# Patient Record
Sex: FEMALE | URBAN - METROPOLITAN AREA
[De-identification: names, ages, dates, MRNs, and addresses within clinical notes are randomized per-mention and may not be internally consistent; named-entity substitution may affect disease eponyms.]

---

## 2023-04-26 ENCOUNTER — APPOINTMENT (RX ONLY)
Dept: URBAN - METROPOLITAN AREA CLINIC 111 | Facility: CLINIC | Age: 28
Setting detail: DERMATOLOGY
End: 2023-04-26

## 2023-04-26 DIAGNOSIS — L82.1 OTHER SEBORRHEIC KERATOSIS: ICD-10-CM

## 2023-04-26 DIAGNOSIS — D49.2 NEOPLASM OF UNSPECIFIED BEHAVIOR OF BONE, SOFT TISSUE, AND SKIN: ICD-10-CM

## 2023-04-26 DIAGNOSIS — L57.8 OTHER SKIN CHANGES DUE TO CHRONIC EXPOSURE TO NONIONIZING RADIATION: ICD-10-CM

## 2023-04-26 DIAGNOSIS — L81.3 CAFÉ AU LAIT SPOTS: ICD-10-CM

## 2023-04-26 DIAGNOSIS — L90.5 SCAR CONDITIONS AND FIBROSIS OF SKIN: ICD-10-CM

## 2023-04-26 DIAGNOSIS — D22 MELANOCYTIC NEVI: ICD-10-CM

## 2023-04-26 PROBLEM — D22.5 MELANOCYTIC NEVI OF TRUNK: Status: ACTIVE | Noted: 2023-04-26

## 2023-04-26 PROBLEM — D22.71 MELANOCYTIC NEVI OF RIGHT LOWER LIMB, INCLUDING HIP: Status: ACTIVE | Noted: 2023-04-26

## 2023-04-26 PROBLEM — D22.4 MELANOCYTIC NEVI OF SCALP AND NECK: Status: ACTIVE | Noted: 2023-04-26

## 2023-04-26 PROBLEM — D22.61 MELANOCYTIC NEVI OF RIGHT UPPER LIMB, INCLUDING SHOULDER: Status: ACTIVE | Noted: 2023-04-26

## 2023-04-26 PROBLEM — D22.72 MELANOCYTIC NEVI OF LEFT LOWER LIMB, INCLUDING HIP: Status: ACTIVE | Noted: 2023-04-26

## 2023-04-26 PROBLEM — D22.39 MELANOCYTIC NEVI OF OTHER PARTS OF FACE: Status: ACTIVE | Noted: 2023-04-26

## 2023-04-26 PROBLEM — D22.62 MELANOCYTIC NEVI OF LEFT UPPER LIMB, INCLUDING SHOULDER: Status: ACTIVE | Noted: 2023-04-26

## 2023-04-26 PROCEDURE — ? BIOPSY BY SHAVE METHOD

## 2023-04-26 PROCEDURE — ? COUNSELING

## 2023-04-26 PROCEDURE — 11102 TANGNTL BX SKIN SINGLE LES: CPT

## 2023-04-26 PROCEDURE — ? PHOTO-DOCUMENTATION

## 2023-04-26 PROCEDURE — ? SUNSCREEN RECOMMENDATIONS

## 2023-04-26 PROCEDURE — ? DIAGNOSIS COMMENT

## 2023-04-26 PROCEDURE — ? OBSERVATION AND MEASURE

## 2023-04-26 PROCEDURE — 99203 OFFICE O/P NEW LOW 30 MIN: CPT | Mod: 25

## 2023-04-26 ASSESSMENT — LOCATION DETAILED DESCRIPTION DERM
LOCATION DETAILED: RIGHT ANTERIOR DISTAL THIGH
LOCATION DETAILED: RIGHT ANTERIOR SHOULDER
LOCATION DETAILED: RIGHT CENTRAL MANDIBULAR CHEEK
LOCATION DETAILED: LEFT SUPERIOR ANTERIOR NECK
LOCATION DETAILED: RIGHT ANTERIOR DISTAL UPPER ARM
LOCATION DETAILED: RIGHT POSTERIOR SHOULDER
LOCATION DETAILED: RIGHT VENTRAL DISTAL FOREARM
LOCATION DETAILED: LEFT MID-UPPER BACK
LOCATION DETAILED: RIGHT MEDIAL PROXIMAL PRETIBIAL REGION
LOCATION DETAILED: MIDDLE STERNUM
LOCATION DETAILED: SUPERIOR THORACIC SPINE
LOCATION DETAILED: LEFT ANTERIOR DISTAL THIGH
LOCATION DETAILED: RIGHT MEDIAL PLANTAR HEEL
LOCATION DETAILED: RIGHT RADIAL DORSAL HAND
LOCATION DETAILED: LEFT PROXIMAL CALF
LOCATION DETAILED: LEFT ANTERIOR DISTAL UPPER ARM
LOCATION DETAILED: RIGHT LATERAL BUCCAL CHEEK
LOCATION DETAILED: LEFT POSTERIOR ANKLE
LOCATION DETAILED: LEFT VENTRAL PROXIMAL FOREARM

## 2023-04-26 ASSESSMENT — LOCATION SIMPLE DESCRIPTION DERM
LOCATION SIMPLE: LEFT CALF
LOCATION SIMPLE: LEFT UPPER ARM
LOCATION SIMPLE: RIGHT THIGH
LOCATION SIMPLE: LEFT UPPER BACK
LOCATION SIMPLE: CHEST
LOCATION SIMPLE: RIGHT PLANTAR SURFACE
LOCATION SIMPLE: RIGHT SHOULDER
LOCATION SIMPLE: LEFT FOREARM
LOCATION SIMPLE: LEFT ANTERIOR NECK
LOCATION SIMPLE: UPPER BACK
LOCATION SIMPLE: LEFT THIGH
LOCATION SIMPLE: RIGHT FOREARM
LOCATION SIMPLE: RIGHT UPPER ARM
LOCATION SIMPLE: RIGHT CHEEK
LOCATION SIMPLE: RIGHT PRETIBIAL REGION
LOCATION SIMPLE: RIGHT HAND
LOCATION SIMPLE: LEFT ANKLE

## 2023-04-26 ASSESSMENT — LOCATION ZONE DERM
LOCATION ZONE: ARM
LOCATION ZONE: HAND
LOCATION ZONE: FACE
LOCATION ZONE: NECK
LOCATION ZONE: LEG
LOCATION ZONE: TRUNK
LOCATION ZONE: FEET

## 2023-04-26 NOTE — PROCEDURE: OBSERVATION
Detail Level: Detailed
Size Of Lesion: 4mm x 3mm light brown macule
Size Of Lesion: 4mm x 3mm brown macule with darker spots

## 2023-04-26 NOTE — PROCEDURE: DIAGNOSIS COMMENT
Comment: This is stable and chronic. Recommend monitoring for change. Recommend biopsy with change.
Detail Level: Simple
Render Risk Assessment In Note?: no
Comment: Recommended kelocote scar gel with firm massages. Discussed ILK vs. excision if no improvement noted.

## 2023-04-26 NOTE — PROCEDURE: BIOPSY BY SHAVE METHOD
Detail Level: Detailed
Depth Of Biopsy: dermis
Was A Bandage Applied: Yes
Size Of Lesion In Cm: 0.4
X Size Of Lesion In Cm: 0
Biopsy Type: H and E
Biopsy Method: Dermablade
Anesthesia Type: 1% lidocaine with epinephrine and a 1:10 solution of 8.4% sodium bicarbonate
Anesthesia Volume In Cc (Will Not Render If 0): 0.5
Hemostasis: Drysol
Wound Care: Polysporin ointment
Dressing: bandage
Destruction After The Procedure: No
Type Of Destruction Used: Curettage
Curettage Text: The wound bed was treated with curettage after the biopsy was performed.
Cryotherapy Text: The wound bed was treated with cryotherapy after the biopsy was performed.
Electrodesiccation Text: The wound bed was treated with electrodesiccation after the biopsy was performed.
Electrodesiccation And Curettage Text: The wound bed was treated with electrodesiccation and curettage after the biopsy was performed.
Silver Nitrate Text: The wound bed was treated with silver nitrate after the biopsy was performed.
Lab: [id number]
Lab Facility: [id number]
Consent: Verbal consent was obtained and risks were reviewed including but not limited to scarring, infection, bleeding, scabbing, incomplete removal, nerve damage and allergy to anesthesia.
Post-Care Instructions: CARE INSTRUCTIONS: SHAVE BIOPSY \\nThis information is also available on our website: [email or web address]\\nWound Site Care\\n    ? Keep wound dry today and remove bandage in 24 hours. \\n    ? Shower normally, allowing soap and water to run across the wound, but do not scrub. Antibacterial soaps, such as Dial, may be used daily. \\n    ? Keep wound moist with application of Polysporin ointment or Vaseline 1-2 times daily. (If you have itching or irritation from topical antibiotics like Polysporin, then switch to plain Vaseline.) Keeping the wound moist reduces infection, minimizes scarring, and prevents crust formation over the wound. \\n    ? Cover with clean bandage daily. If you have steri-strips (small white bandages) across the wound, leave intact. These typically fall off within a few days. \\n    ? Avoid picking at the wound site, which increases risk of infection and scarring. \\n    ? If wound is near the eyes, cold compresses may be used for 20 minutes each hour to minimize pain, swelling, and bruising. Puffiness under the eyes is possible for a few days after the procedure. \\n    ? For any wound discomfort, you may take Tylenol (or acetaminophen). Adults may take 500-1000 mg every six hours (if you are able to take Tylenol). Or use cold compresses for up to 20 minutes hourly as needed. \\n    ? Allow several weeks for wound to fully heal. Temporary discoloration at the wound site is normal and may take several months to fade.\\nIf You Have Bleeding\\n    ? Hold firm pressure for 20 minutes, without lifting the pressure to look at wound. Repeat as needed. \\n    ? If bleeding does not stop, apply ice compresses and call our office (or report to the nearest ER.)\\nActivities to Avoid\\nIf your wound is large or has sutures, then until sutures are removed you should:\\n    ? Avoid contaminated water (lake, river, or ocean); use waterproof bandage in chlorinated pool. \\n    ? If sutures are present, minimize activity that produces stress to excision site (heavy lifting or exercise that pulls at the wound, such as lunges for thigh wounds or golf for upper back wounds)\\nWatch for Infection\\n    ? Slight redness, initial tenderness, and clear yellow discharge are normal. \\n    ? Call the office if you have signs of infection, such as increased tenderness, warmth, spreading redness, swelling, or thick white-to-yellow discharge. \\n    ? Seek urgent medical attention for severe infection, with fever, chills, nausea, or pain radiating from the wound to surrounding tissue.\\nNext Steps\\n    ? If you have sutures, return for suture removal in 5-14 days, as directed by physician, but call sooner if you are concerned about your wound. \\n    ? Pathology results will be given by telephone in 7-10 days. Sometimes results may take longer depending on if special stains or testing is needed. If you have not received results after 14 days it is important that you call our office and let us know. \\n    ? Return to clinic if you notice any new or changing moles, or if physician recommended regular skin exams.\\nLaboratory Billing Information\\n    ? Your specimen has been sent for laboratory testing. The lab our office typically uses is [facility], if you have any questions about your bill.\\n    ? If you have insurance coverage, we have forwarded your insurance details to the lab. If a balance is owed once the claim has been processed, you will receive an invoice from the laboratory directly
Notification Instructions: Patient will be notified of biopsy results. However, patient instructed to call the office if not contacted within 2 weeks.
Billing Type: Third-Party Bill
Information: Selecting Yes will display possible errors in your note based on the variables you have selected. This validation is only offered as a suggestion for you. PLEASE NOTE THAT THE VALIDATION TEXT WILL BE REMOVED WHEN YOU FINALIZE YOUR NOTE. IF YOU WANT TO FAX A PRELIMINARY NOTE YOU WILL NEED TO TOGGLE THIS TO 'NO' IF YOU DO NOT WANT IT IN YOUR FAXED NOTE.

## 2024-04-25 ENCOUNTER — APPOINTMENT (RX ONLY)
Dept: URBAN - METROPOLITAN AREA CLINIC 111 | Facility: CLINIC | Age: 29
Setting detail: DERMATOLOGY
End: 2024-04-25

## 2024-04-25 DIAGNOSIS — D485 NEOPLASM OF UNCERTAIN BEHAVIOR OF SKIN: ICD-10-CM

## 2024-04-25 DIAGNOSIS — L81.3 CAFÉ AU LAIT SPOTS: ICD-10-CM

## 2024-04-25 DIAGNOSIS — Z71.89 OTHER SPECIFIED COUNSELING: ICD-10-CM

## 2024-04-25 DIAGNOSIS — L28.0 LICHEN SIMPLEX CHRONICUS: ICD-10-CM

## 2024-04-25 DIAGNOSIS — L82.1 OTHER SEBORRHEIC KERATOSIS: ICD-10-CM

## 2024-04-25 DIAGNOSIS — L72.8 OTHER FOLLICULAR CYSTS OF THE SKIN AND SUBCUTANEOUS TISSUE: ICD-10-CM

## 2024-04-25 DIAGNOSIS — D22 MELANOCYTIC NEVI: ICD-10-CM

## 2024-04-25 DIAGNOSIS — L57.8 OTHER SKIN CHANGES DUE TO CHRONIC EXPOSURE TO NONIONIZING RADIATION: ICD-10-CM

## 2024-04-25 PROBLEM — D22.39 MELANOCYTIC NEVI OF OTHER PARTS OF FACE: Status: ACTIVE | Noted: 2024-04-25

## 2024-04-25 PROBLEM — D48.5 NEOPLASM OF UNCERTAIN BEHAVIOR OF SKIN: Status: ACTIVE | Noted: 2024-04-25

## 2024-04-25 PROBLEM — D22.4 MELANOCYTIC NEVI OF SCALP AND NECK: Status: ACTIVE | Noted: 2024-04-25

## 2024-04-25 PROBLEM — D22.61 MELANOCYTIC NEVI OF RIGHT UPPER LIMB, INCLUDING SHOULDER: Status: ACTIVE | Noted: 2024-04-25

## 2024-04-25 PROBLEM — D22.71 MELANOCYTIC NEVI OF RIGHT LOWER LIMB, INCLUDING HIP: Status: ACTIVE | Noted: 2024-04-25

## 2024-04-25 PROBLEM — D22.5 MELANOCYTIC NEVI OF TRUNK: Status: ACTIVE | Noted: 2024-04-25

## 2024-04-25 PROBLEM — D22.72 MELANOCYTIC NEVI OF LEFT LOWER LIMB, INCLUDING HIP: Status: ACTIVE | Noted: 2024-04-25

## 2024-04-25 PROBLEM — D22.62 MELANOCYTIC NEVI OF LEFT UPPER LIMB, INCLUDING SHOULDER: Status: ACTIVE | Noted: 2024-04-25

## 2024-04-25 PROCEDURE — 11102 TANGNTL BX SKIN SINGLE LES: CPT

## 2024-04-25 PROCEDURE — ? SUNSCREEN RECOMMENDATIONS

## 2024-04-25 PROCEDURE — 11103 TANGNTL BX SKIN EA SEP/ADDL: CPT

## 2024-04-25 PROCEDURE — 99214 OFFICE O/P EST MOD 30 MIN: CPT | Mod: 25

## 2024-04-25 PROCEDURE — ? DIAGNOSIS COMMENT

## 2024-04-25 PROCEDURE — ? PRESCRIPTION MEDICATION MANAGEMENT

## 2024-04-25 PROCEDURE — ? PRESCRIPTION

## 2024-04-25 PROCEDURE — ? COUNSELING

## 2024-04-25 PROCEDURE — ? BIOPSY BY SHAVE METHOD

## 2024-04-25 PROCEDURE — ? OBSERVATION AND MEASURE

## 2024-04-25 RX ORDER — HALOBETASOL PROPIONATE 0.5 MG/G
CREAM TOPICAL
Qty: 50 | Refills: 2 | Status: ERX | COMMUNITY
Start: 2024-04-25

## 2024-04-25 RX ADMIN — HALOBETASOL PROPIONATE: 0.5 CREAM TOPICAL at 00:00

## 2024-04-25 ASSESSMENT — LOCATION DETAILED DESCRIPTION DERM
LOCATION DETAILED: LEFT SUPERIOR ANTERIOR NECK
LOCATION DETAILED: RIGHT RADIAL DORSAL HAND
LOCATION DETAILED: RIGHT ANTERIOR DISTAL UPPER ARM
LOCATION DETAILED: LEFT MID-UPPER BACK
LOCATION DETAILED: LEFT VENTRAL PROXIMAL FOREARM
LOCATION DETAILED: LEFT PROXIMAL CALF
LOCATION DETAILED: LEFT POSTERIOR ANKLE
LOCATION DETAILED: RIGHT ANTERIOR DISTAL THIGH
LOCATION DETAILED: RIGHT DISTAL POSTERIOR UPPER ARM
LOCATION DETAILED: RIGHT LATERAL BUCCAL CHEEK
LOCATION DETAILED: NASAL DORSUM
LOCATION DETAILED: LEFT ANTERIOR DISTAL THIGH
LOCATION DETAILED: LEFT SUPERIOR UPPER BACK
LOCATION DETAILED: RIGHT VENTRAL DISTAL FOREARM
LOCATION DETAILED: RIGHT ANTERIOR SHOULDER
LOCATION DETAILED: RIGHT MEDIAL PLANTAR HEEL
LOCATION DETAILED: MIDDLE STERNUM
LOCATION DETAILED: LEFT ELBOW
LOCATION DETAILED: RIGHT ELBOW
LOCATION DETAILED: SUPERIOR THORACIC SPINE
LOCATION DETAILED: LEFT PROXIMAL POSTERIOR UPPER ARM
LOCATION DETAILED: LEFT ANTERIOR DISTAL UPPER ARM
LOCATION DETAILED: RIGHT MEDIAL PROXIMAL PRETIBIAL REGION

## 2024-04-25 ASSESSMENT — LOCATION ZONE DERM
LOCATION ZONE: NOSE
LOCATION ZONE: ARM
LOCATION ZONE: NECK
LOCATION ZONE: LEG
LOCATION ZONE: FEET
LOCATION ZONE: FACE
LOCATION ZONE: HAND
LOCATION ZONE: TRUNK

## 2024-04-25 ASSESSMENT — LOCATION SIMPLE DESCRIPTION DERM
LOCATION SIMPLE: RIGHT CHEEK
LOCATION SIMPLE: RIGHT UPPER ARM
LOCATION SIMPLE: RIGHT THIGH
LOCATION SIMPLE: LEFT THIGH
LOCATION SIMPLE: LEFT FOREARM
LOCATION SIMPLE: LEFT ANKLE
LOCATION SIMPLE: LEFT UPPER BACK
LOCATION SIMPLE: UPPER BACK
LOCATION SIMPLE: RIGHT SHOULDER
LOCATION SIMPLE: LEFT CALF
LOCATION SIMPLE: LEFT ANTERIOR NECK
LOCATION SIMPLE: RIGHT POSTERIOR UPPER ARM
LOCATION SIMPLE: CHEST
LOCATION SIMPLE: LEFT POSTERIOR UPPER ARM
LOCATION SIMPLE: RIGHT FOREARM
LOCATION SIMPLE: RIGHT ELBOW
LOCATION SIMPLE: RIGHT HAND
LOCATION SIMPLE: RIGHT PRETIBIAL REGION
LOCATION SIMPLE: LEFT ELBOW
LOCATION SIMPLE: RIGHT PLANTAR SURFACE
LOCATION SIMPLE: LEFT UPPER ARM
LOCATION SIMPLE: NOSE

## 2024-04-25 NOTE — HPI: DRY SKIN
How Severe Is Your Dry Skin?: moderate
Is This A New Presentation Or A Follow-Up?: Dry Skin
Additional History: Patient currently uses CeraVe cream

## 2024-04-25 NOTE — PROCEDURE: PRESCRIPTION MEDICATION MANAGEMENT
Render In Strict Bullet Format?: Yes
Detail Level: Zone
Initiate Treatment: -\\n- Halobetasol: apply to affected areas of the body (arms) twice daily for two weeks on, one week off. Repeat cycle as needed for flares. Avoid face/groin/axillae.\\n- Moisturize daily with CeraVe Cream

## 2024-04-25 NOTE — PROCEDURE: BIOPSY BY SHAVE METHOD
Detail Level: Detailed
Depth Of Biopsy: dermis
Was A Bandage Applied: Yes
Size Of Lesion In Cm: 0.3
X Size Of Lesion In Cm: 0
Biopsy Type: H and E
Biopsy Method: Dermablade
Anesthesia Type: 1% lidocaine with epinephrine
Anesthesia Volume In Cc: 0.5
Hemostasis: Drysol
Wound Care: Polysporin ointment
Dressing: bandage
Destruction After The Procedure: No
Type Of Destruction Used: Curettage
Curettage Text: The wound bed was treated with curettage after the biopsy was performed.
Cryotherapy Text: The wound bed was treated with cryotherapy after the biopsy was performed.
Electrodesiccation Text: The wound bed was treated with electrodesiccation after the biopsy was performed.
Electrodesiccation And Curettage Text: The wound bed was treated with electrodesiccation and curettage after the biopsy was performed.
Silver Nitrate Text: The wound bed was treated with silver nitrate after the biopsy was performed.
Lab: [id number]
Lab Facility: [id number]
Consent: Verbal consent was obtained and risks were reviewed including but not limited to scarring, infection, bleeding, scabbing, incomplete removal, nerve damage and allergy to anesthesia.
Post-Care Instructions: CARE INSTRUCTIONS: SHAVE BIOPSY \\nThis information is also available on our website: [email or web address]\\nWound Site Care\\n    • Keep wound dry today and remove bandage in 24 hours. \\n    • Shower normally, allowing soap and water to run across the wound, but do not scrub. Antibacterial soaps, such as Dial, may be used daily. \\n    • Keep wound moist with application of Polysporin ointment or Vaseline 1-2 times daily. (If you have itching or irritation from topical antibiotics like Polysporin, then switch to plain Vaseline.) Keeping the wound moist reduces infection, minimizes scarring, and prevents crust formation over the wound. \\n    • Cover with clean bandage daily. If you have steri-strips (small white bandages) across the wound, leave intact. These typically fall off within a few days. \\n    • Avoid picking at the wound site, which increases risk of infection and scarring. \\n    • If wound is near the eyes, cold compresses may be used for 20 minutes each hour to minimize pain, swelling, and bruising. Puffiness under the eyes is possible for a few days after the procedure. \\n    • For any wound discomfort, you may take Tylenol (or acetaminophen). Adults may take 500-1000 mg every six hours (if you are able to take Tylenol). Or use cold compresses for up to 20 minutes hourly as needed. \\n    • Allow several weeks for wound to fully heal. Temporary discoloration at the wound site is normal and may take several months to fade.\\nIf You Have Bleeding\\n    • Hold firm pressure for 20 minutes, without lifting the pressure to look at wound. Repeat as needed. \\n    • If bleeding does not stop, apply ice compresses and call our office (or report to the nearest ER.)\\nActivities to Avoid\\nIf your wound is large or has sutures, then until sutures are removed you should:\\n    • Avoid contaminated water (lake, river, or ocean); use waterproof bandage in chlorinated pool. \\n    • If sutures are present, minimize activity that produces stress to excision site (heavy lifting or exercise that pulls at the wound, such as lunges for thigh wounds or golf for upper back wounds)\\nWatch for Infection\\n    • Slight redness, initial tenderness, and clear yellow discharge are normal. \\n    • Call the office if you have signs of infection, such as increased tenderness, warmth, spreading redness, swelling, or thick white-to-yellow discharge. \\n    • Seek urgent medical attention for severe infection, with fever, chills, nausea, or pain radiating from the wound to surrounding tissue.\\nNext Steps\\n    • If you have sutures, return for suture removal in 5-14 days, as directed by physician, but call sooner if you are concerned about your wound. \\n    • Pathology results will be given by telephone in 7-10 days. Sometimes results may take longer depending on if special stains or testing is needed. If you have not received results after 14 days it is important that you call our office and let us know. \\n    • Return to clinic if you notice any new or changing moles, or if physician recommended regular skin exams.\\nLaboratory Billing Information\\n    • Your specimen has been sent for laboratory testing. The lab our office typically uses is [facility], if you have any questions about your bill.\\n    • If you have insurance coverage, we have forwarded your insurance details to the lab. If a balance is owed once the claim has been processed, you will receive an invoice from the laboratory directly
Notification Instructions: Patient will be notified of biopsy results. However, patient instructed to call the office if not contacted within 2 weeks.
Billing Type: Third-Party Bill
Information: Selecting Yes will display possible errors in your note based on the variables you have selected. This validation is only offered as a suggestion for you. PLEASE NOTE THAT THE VALIDATION TEXT WILL BE REMOVED WHEN YOU FINALIZE YOUR NOTE. IF YOU WANT TO FAX A PRELIMINARY NOTE YOU WILL NEED TO TOGGLE THIS TO 'NO' IF YOU DO NOT WANT IT IN YOUR FAXED NOTE.

## 2024-04-25 NOTE — PROCEDURE: OBSERVATION
Detail Level: Detailed
Size Of Lesion: 4mm x 3mm light brown macule (stable)
Size Of Lesion: 4mm x 3mm brown macule with darker spots (stable)

## 2024-04-25 NOTE — HPI: EVALUATION OF SKIN LESION(S)
What Type Of Note Output Would You Prefer (Optional)?: Standard Output
How Severe Are Your Spot(S)?: moderate
Have Your Spot(S) Been Treated In The Past?: has not been treated
Hpi Title: Evaluation of Skin Lesions
Additional History: Patient presents for fbse, last fbse was 4/23

## 2025-04-24 ENCOUNTER — APPOINTMENT (OUTPATIENT)
Dept: URBAN - METROPOLITAN AREA CLINIC 111 | Facility: CLINIC | Age: 30
Setting detail: DERMATOLOGY
End: 2025-04-24

## 2025-04-24 DIAGNOSIS — L57.8 OTHER SKIN CHANGES DUE TO CHRONIC EXPOSURE TO NONIONIZING RADIATION: ICD-10-CM

## 2025-04-24 DIAGNOSIS — L72.8 OTHER FOLLICULAR CYSTS OF THE SKIN AND SUBCUTANEOUS TISSUE: ICD-10-CM

## 2025-04-24 DIAGNOSIS — D485 NEOPLASM OF UNCERTAIN BEHAVIOR OF SKIN: ICD-10-CM

## 2025-04-24 DIAGNOSIS — D22 MELANOCYTIC NEVI: ICD-10-CM

## 2025-04-24 DIAGNOSIS — L82.0 INFLAMED SEBORRHEIC KERATOSIS: ICD-10-CM

## 2025-04-24 DIAGNOSIS — Z71.89 OTHER SPECIFIED COUNSELING: ICD-10-CM

## 2025-04-24 DIAGNOSIS — L81.3 CAFÉ AU LAIT SPOTS: ICD-10-CM

## 2025-04-24 DIAGNOSIS — L82.1 OTHER SEBORRHEIC KERATOSIS: ICD-10-CM

## 2025-04-24 PROBLEM — D22.72 MELANOCYTIC NEVI OF LEFT LOWER LIMB, INCLUDING HIP: Status: ACTIVE | Noted: 2025-04-24

## 2025-04-24 PROBLEM — D22.61 MELANOCYTIC NEVI OF RIGHT UPPER LIMB, INCLUDING SHOULDER: Status: ACTIVE | Noted: 2025-04-24

## 2025-04-24 PROBLEM — D22.5 MELANOCYTIC NEVI OF TRUNK: Status: ACTIVE | Noted: 2025-04-24

## 2025-04-24 PROBLEM — D22.62 MELANOCYTIC NEVI OF LEFT UPPER LIMB, INCLUDING SHOULDER: Status: ACTIVE | Noted: 2025-04-24

## 2025-04-24 PROBLEM — D48.5 NEOPLASM OF UNCERTAIN BEHAVIOR OF SKIN: Status: ACTIVE | Noted: 2025-04-24

## 2025-04-24 PROBLEM — D22.39 MELANOCYTIC NEVI OF OTHER PARTS OF FACE: Status: ACTIVE | Noted: 2025-04-24

## 2025-04-24 PROBLEM — D22.4 MELANOCYTIC NEVI OF SCALP AND NECK: Status: ACTIVE | Noted: 2025-04-24

## 2025-04-24 PROBLEM — D22.71 MELANOCYTIC NEVI OF RIGHT LOWER LIMB, INCLUDING HIP: Status: ACTIVE | Noted: 2025-04-24

## 2025-04-24 PROCEDURE — 99213 OFFICE O/P EST LOW 20 MIN: CPT | Mod: 25

## 2025-04-24 PROCEDURE — ? LIQUID NITROGEN

## 2025-04-24 PROCEDURE — 17110 DESTRUCTION B9 LES UP TO 14: CPT

## 2025-04-24 PROCEDURE — ? DIAGNOSIS COMMENT

## 2025-04-24 PROCEDURE — ? OBSERVATION

## 2025-04-24 PROCEDURE — ? SUNSCREEN RECOMMENDATIONS

## 2025-04-24 PROCEDURE — ? COUNSELING

## 2025-04-24 ASSESSMENT — LOCATION DETAILED DESCRIPTION DERM
LOCATION DETAILED: LEFT SUPERIOR ANTERIOR NECK
LOCATION DETAILED: RIGHT ANTERIOR DISTAL UPPER ARM
LOCATION DETAILED: RIGHT MEDIAL PLANTAR HEEL
LOCATION DETAILED: RIGHT ANTERIOR DISTAL THIGH
LOCATION DETAILED: RIGHT RADIAL DORSAL HAND
LOCATION DETAILED: RIGHT ANTERIOR SHOULDER
LOCATION DETAILED: LEFT PROXIMAL CALF
LOCATION DETAILED: LEFT PROXIMAL POSTERIOR UPPER ARM
LOCATION DETAILED: LEFT ANTERIOR DISTAL UPPER ARM
LOCATION DETAILED: LEFT VENTRAL PROXIMAL FOREARM
LOCATION DETAILED: RIGHT INFERIOR MEDIAL MALAR CHEEK
LOCATION DETAILED: LEFT ANTERIOR DISTAL THIGH
LOCATION DETAILED: RIGHT MEDIAL PROXIMAL PRETIBIAL REGION
LOCATION DETAILED: RIGHT VENTRAL DISTAL FOREARM
LOCATION DETAILED: MIDDLE STERNUM
LOCATION DETAILED: RIGHT DISTAL POSTERIOR UPPER ARM
LOCATION DETAILED: SUPERIOR THORACIC SPINE
LOCATION DETAILED: LEFT MID-UPPER BACK
LOCATION DETAILED: LEFT POSTERIOR ANKLE
LOCATION DETAILED: RIGHT LATERAL BUCCAL CHEEK

## 2025-04-24 ASSESSMENT — LOCATION SIMPLE DESCRIPTION DERM
LOCATION SIMPLE: CHEST
LOCATION SIMPLE: UPPER BACK
LOCATION SIMPLE: RIGHT PRETIBIAL REGION
LOCATION SIMPLE: RIGHT HAND
LOCATION SIMPLE: RIGHT PLANTAR SURFACE
LOCATION SIMPLE: LEFT UPPER ARM
LOCATION SIMPLE: RIGHT SHOULDER
LOCATION SIMPLE: RIGHT THIGH
LOCATION SIMPLE: RIGHT FOREARM
LOCATION SIMPLE: LEFT CALF
LOCATION SIMPLE: LEFT UPPER BACK
LOCATION SIMPLE: LEFT POSTERIOR UPPER ARM
LOCATION SIMPLE: RIGHT UPPER ARM
LOCATION SIMPLE: RIGHT POSTERIOR UPPER ARM
LOCATION SIMPLE: RIGHT CHEEK
LOCATION SIMPLE: LEFT THIGH
LOCATION SIMPLE: LEFT FOREARM
LOCATION SIMPLE: LEFT ANKLE
LOCATION SIMPLE: LEFT ANTERIOR NECK

## 2025-04-24 ASSESSMENT — LOCATION ZONE DERM
LOCATION ZONE: FEET
LOCATION ZONE: HAND
LOCATION ZONE: FACE
LOCATION ZONE: LEG
LOCATION ZONE: NECK
LOCATION ZONE: ARM
LOCATION ZONE: TRUNK

## 2025-04-24 NOTE — PROCEDURE: OBSERVATION
Detail Level: Detailed
Size Of Lesion: 4mm x 3mm light brown macule (stable)
Size Of Lesion: 4mm x 3mm brown macule with darker spots (stable)
Size Of Lesion: 7mm

## 2025-04-24 NOTE — HPI: EVALUATION OF SKIN LESION(S)
What Type Of Note Output Would You Prefer (Optional)?: Standard Output
How Severe Are Your Spot(S)?: moderate
Have Your Spot(S) Been Treated In The Past?: has not been treated
Hpi Title: Evaluation of Skin Lesions
Additional History: Patient presents for fbse , last fbse 04/2024.

## 2025-04-24 NOTE — PROCEDURE: DIAGNOSIS COMMENT
Comment: This is stable and chronic. Recommend monitoring for change. Recommend biopsy with change.
Detail Level: Simple
Render Risk Assessment In Note?: no
Comment: Patient would like to have mole removed. Discussed to have an excision with Dr. Shirley for the best outcome. Task has been sent to Dr. Shirley’s team.

## 2025-05-20 ENCOUNTER — APPOINTMENT (OUTPATIENT)
Dept: URBAN - METROPOLITAN AREA CLINIC 111 | Facility: CLINIC | Age: 30
Setting detail: DERMATOLOGY
End: 2025-05-20

## 2025-05-20 DIAGNOSIS — D22 MELANOCYTIC NEVI: ICD-10-CM

## 2025-05-20 PROBLEM — D48.5 NEOPLASM OF UNCERTAIN BEHAVIOR OF SKIN: Status: ACTIVE | Noted: 2025-05-20

## 2025-05-20 PROCEDURE — 99212 OFFICE O/P EST SF 10 MIN: CPT

## 2025-05-20 PROCEDURE — ? CONSULTATION FOR EXCISION OF BENIGN LESIONS

## 2025-05-20 ASSESSMENT — LOCATION DETAILED DESCRIPTION DERM: LOCATION DETAILED: RIGHT INFERIOR CENTRAL MALAR CHEEK

## 2025-05-20 ASSESSMENT — LOCATION ZONE DERM: LOCATION ZONE: FACE

## 2025-05-20 ASSESSMENT — LOCATION SIMPLE DESCRIPTION DERM: LOCATION SIMPLE: RIGHT CHEEK

## 2025-05-20 NOTE — PROCEDURE: MIPS QUALITY
Detail Level: Zone
Quality 130: Documentation Of Current Medications In The Medical Record: Current Medications Documented
Quality 431: Preventive Care And Screening: Unhealthy Alcohol Use - Screening: Patient not identified as an unhealthy alcohol user when screened for unhealthy alcohol use using a systematic screening method
Quality 226: Preventive Care And Screening: Tobacco Use: Screening And Cessation Intervention: Patient screened for tobacco use and is an ex/non-smoker
surgical testing/Doctor's office